# Patient Record
Sex: FEMALE | Race: OTHER
[De-identification: names, ages, dates, MRNs, and addresses within clinical notes are randomized per-mention and may not be internally consistent; named-entity substitution may affect disease eponyms.]

---

## 2019-04-03 ENCOUNTER — HOSPITAL ENCOUNTER (OUTPATIENT)
Dept: HOSPITAL 61 - 3 SO LND | Age: 19
Setting detail: OBSERVATION
Discharge: HOME | End: 2019-04-03
Attending: SPECIALIST | Admitting: SPECIALIST
Payer: SELF-PAY

## 2019-04-03 DIAGNOSIS — N93.9: ICD-10-CM

## 2019-04-03 DIAGNOSIS — Z3A.25: ICD-10-CM

## 2019-04-03 DIAGNOSIS — R10.9: ICD-10-CM

## 2019-04-03 DIAGNOSIS — O26.892: Primary | ICD-10-CM

## 2019-04-03 LAB
APTT PPP: YELLOW S
BACTERIA #/AREA URNS HPF: 0 /HPF
BILIRUB UR QL STRIP: NEGATIVE
FIBRINOGEN PPP-MCNC: CLEAR MG/DL
NITRITE UR QL STRIP: NEGATIVE
PH UR STRIP: 7 [PH]
PROT UR STRIP-MCNC: NEGATIVE MG/DL
RBC #/AREA URNS HPF: 0 /HPF (ref 0–2)
SQUAMOUS #/AREA URNS LPF: (no result) /LPF
UROBILINOGEN UR-MCNC: 0.2 MG/DL
WBC #/AREA URNS HPF: (no result) /HPF (ref 0–4)

## 2019-04-03 PROCEDURE — 87086 URINE CULTURE/COLONY COUNT: CPT

## 2019-04-03 PROCEDURE — G0379 DIRECT REFER HOSPITAL OBSERV: HCPCS

## 2019-04-03 PROCEDURE — 76805 OB US >/= 14 WKS SNGL FETUS: CPT

## 2019-04-03 PROCEDURE — 81001 URINALYSIS AUTO W/SCOPE: CPT

## 2019-04-03 PROCEDURE — G0378 HOSPITAL OBSERVATION PER HR: HCPCS

## 2019-04-03 NOTE — RAD
Obstetrical ultrasound, 4/3/2019:



History: Unsure of dates, no prenatal care



There is a single intrauterine fetus in a variable orientation.  The fetal

biparietal diameter measures 4.9 cm compatible with a gestational age of 20-21

weeks.  This corresponds well with the other fetal measurements yielding an

average gestational age of 20 weeks and 3 days and a sonographic EDC of

8/18/2019.



Normal fetal activity and heart motion were seen.  A four-chamber fetal heart

is evident with a fetal heart rate of 137 bpm.  Fluid is identified in the

fetal bladder and stomach.  The visualized portions of the fetal spine and

kidneys are unremarkable.  A three-vessel umbilical cord is identified with a

normal fetal cord insertion site.



A normal amount of amniotic fluid is present.  The placenta lies posteriorly

extending into the fundal region.  There is no evidence of placenta previa. 

The cervical length is 3.8 cm.



IMPRESSION: Single viable intrauterine fetus of 20-21 weeks gestational age as

described above.

## 2020-12-11 VITALS
DIASTOLIC BLOOD PRESSURE: 74 MMHG | SYSTOLIC BLOOD PRESSURE: 106 MMHG | DIASTOLIC BLOOD PRESSURE: 74 MMHG | SYSTOLIC BLOOD PRESSURE: 106 MMHG | SYSTOLIC BLOOD PRESSURE: 106 MMHG | DIASTOLIC BLOOD PRESSURE: 74 MMHG

## 2022-01-06 ENCOUNTER — HOSPITAL ENCOUNTER (OUTPATIENT)
Dept: HOSPITAL 61 - 3 SO LND | Age: 22
Setting detail: OBSERVATION
LOS: 1 days | Discharge: HOME | End: 2022-01-07
Attending: OBSTETRICS & GYNECOLOGY | Admitting: OBSTETRICS & GYNECOLOGY
Payer: MEDICAID

## 2022-01-06 VITALS — BODY MASS INDEX: 23.32 KG/M2 | WEIGHT: 131.62 LBS | HEIGHT: 63 IN

## 2022-01-06 DIAGNOSIS — R10.30: ICD-10-CM

## 2022-01-06 DIAGNOSIS — Z79.899: ICD-10-CM

## 2022-01-06 DIAGNOSIS — O26.899: Primary | ICD-10-CM

## 2022-01-06 DIAGNOSIS — Z3A.00: ICD-10-CM

## 2022-01-06 LAB
AMPHETAMINE/METHAMPHETAMINE: (no result)
APTT PPP: YELLOW S
BACTERIA #/AREA URNS HPF: (no result) /HPF
BARBITURATES UR-MCNC: (no result) UG/ML
BASOPHILS # BLD AUTO: 0 X10^3/UL (ref 0–0.2)
BASOPHILS NFR BLD: 0 % (ref 0–3)
BENZODIAZ UR-MCNC: (no result) UG/L
BILIRUB UR QL STRIP: NEGATIVE
CANNABINOIDS UR-MCNC: (no result) UG/L
COCAINE UR-MCNC: (no result) NG/ML
EOSINOPHIL NFR BLD: 0.3 X10^3/UL (ref 0–0.7)
EOSINOPHIL NFR BLD: 3 % (ref 0–3)
ERYTHROCYTE [DISTWIDTH] IN BLOOD BY AUTOMATED COUNT: 19.2 % (ref 11.5–14.5)
FIBRINOGEN PPP-MCNC: CLEAR MG/DL
HCT VFR BLD CALC: 21.6 % (ref 36–47)
HGB BLD-MCNC: 6.5 G/DL (ref 12–15.5)
LYMPHOCYTES # BLD: 1.1 X10^3/UL (ref 1–4.8)
LYMPHOCYTES NFR BLD AUTO: 11 % (ref 24–48)
MCH RBC QN AUTO: 18 PG (ref 25–35)
MCHC RBC AUTO-ENTMCNC: 30 G/DL (ref 31–37)
MCV RBC AUTO: 61 FL (ref 79–100)
METHADONE SERPL-MCNC: (no result) NG/ML
MONO #: 0.6 X10^3/UL (ref 0–1.1)
MONOCYTES NFR BLD: 6 % (ref 0–9)
NEUT #: 8.1 X10^3/UL (ref 1.8–7.7)
NEUTROPHILS NFR BLD AUTO: 81 % (ref 31–73)
NITRITE UR QL STRIP: NEGATIVE
OPIATES UR-MCNC: (no result) NG/ML
PCP SERPL-MCNC: (no result) MG/DL
PH UR STRIP: 6.5 [PH]
PLATELET # BLD AUTO: 246 X10^3/UL (ref 140–400)
PROT UR STRIP-MCNC: NEGATIVE MG/DL
RBC # BLD AUTO: 3.54 X10^6/UL (ref 3.5–5.4)
RBC #/AREA URNS HPF: 0 /HPF (ref 0–2)
UROBILINOGEN UR-MCNC: 1 MG/DL
WBC # BLD AUTO: 10 X10^3/UL (ref 4–11)
WBC #/AREA URNS HPF: (no result) /HPF (ref 0–4)

## 2022-01-06 PROCEDURE — G0379 DIRECT REFER HOSPITAL OBSERV: HCPCS

## 2022-01-06 PROCEDURE — 84466 ASSAY OF TRANSFERRIN: CPT

## 2022-01-06 PROCEDURE — 86762 RUBELLA ANTIBODY: CPT

## 2022-01-06 PROCEDURE — 82728 ASSAY OF FERRITIN: CPT

## 2022-01-06 PROCEDURE — 76819 FETAL BIOPHYS PROFIL W/O NST: CPT

## 2022-01-06 PROCEDURE — 76815 OB US LIMITED FETUS(S): CPT

## 2022-01-06 PROCEDURE — G0378 HOSPITAL OBSERVATION PER HR: HCPCS

## 2022-01-06 PROCEDURE — 83540 ASSAY OF IRON: CPT

## 2022-01-06 PROCEDURE — 96365 THER/PROPH/DIAG IV INF INIT: CPT

## 2022-01-06 PROCEDURE — 87653 STREP B DNA AMP PROBE: CPT

## 2022-01-06 PROCEDURE — 86901 BLOOD TYPING SEROLOGIC RH(D): CPT

## 2022-01-06 PROCEDURE — 86900 BLOOD TYPING SEROLOGIC ABO: CPT

## 2022-01-06 PROCEDURE — 87340 HEPATITIS B SURFACE AG IA: CPT

## 2022-01-06 PROCEDURE — 81001 URINALYSIS AUTO W/SCOPE: CPT

## 2022-01-06 PROCEDURE — 86850 RBC ANTIBODY SCREEN: CPT

## 2022-01-06 PROCEDURE — 87426 SARSCOV CORONAVIRUS AG IA: CPT

## 2022-01-06 PROCEDURE — 80053 COMPREHEN METABOLIC PANEL: CPT

## 2022-01-06 PROCEDURE — 96366 THER/PROPH/DIAG IV INF ADDON: CPT

## 2022-01-06 PROCEDURE — 80307 DRUG TEST PRSMV CHEM ANLYZR: CPT

## 2022-01-06 PROCEDURE — 85025 COMPLETE CBC W/AUTO DIFF WBC: CPT

## 2022-01-06 PROCEDURE — 86592 SYPHILIS TEST NON-TREP QUAL: CPT

## 2022-01-06 PROCEDURE — 36415 COLL VENOUS BLD VENIPUNCTURE: CPT

## 2022-01-07 LAB
ALBUMIN SERPL-MCNC: 2.3 G/DL (ref 3.4–5)
ALBUMIN/GLOB SERPL: 0.5 {RATIO} (ref 1–1.7)
ALP SERPL-CCNC: 167 U/L (ref 46–116)
ALT SERPL-CCNC: 12 U/L (ref 14–59)
ANION GAP SERPL CALC-SCNC: 5 MMOL/L (ref 6–14)
ANISOCYTOSIS BLD QL SMEAR: SLIGHT
AST SERPL-CCNC: 18 U/L (ref 15–37)
BILIRUB SERPL-MCNC: 0.7 MG/DL (ref 0.2–1)
BUN SERPL-MCNC: 7 MG/DL (ref 7–20)
BUN/CREAT SERPL: 14 (ref 6–20)
CALCIUM SERPL-MCNC: 7.6 MG/DL (ref 8.5–10.1)
CHLORIDE SERPL-SCNC: 101 MMOL/L (ref 98–107)
CO2 SERPL-SCNC: 26 MMOL/L (ref 21–32)
CREAT SERPL-MCNC: 0.5 MG/DL (ref 0.6–1)
GFR SERPLBLD BASED ON 1.73 SQ M-ARVRAT: 152.9 ML/MIN
GLUCOSE SERPL-MCNC: 65 MG/DL (ref 70–99)
HYPOCHROMIA BLD QL SMEAR: (no result)
MICROCYTES BLD QL SMEAR: (no result)
PLATELET # BLD EST: ADEQUATE 10*3/UL
POLYCHROMASIA BLD QL SMEAR: SLIGHT
POTASSIUM SERPL-SCNC: 3.9 MMOL/L (ref 3.5–5.1)
PROT SERPL-MCNC: 6.5 G/DL (ref 6.4–8.2)
SODIUM SERPL-SCNC: 132 MMOL/L (ref 136–145)

## 2022-01-07 NOTE — RAD
US OB LIMITED, US FETAL BIOPHYSICAL PROFILE W/O



Clinical Indication: Reason: No Prenatal Care; possible labor. Covid positive.



Comparison: None. 



Technique: Multiple grayscale images, color Doppler, and M-mode images of the uterus are obtained.



Findings:

There is a single intrauterine gestation in cephalic presentation. The placenta is posterior in locat
ion without evidence of placenta previa. The amount of amniotic fluid appears appropriate.  Amniotic 
fluid index is 10.4 cm. Cervical length is 4.1 cm.  



Biometrical data:



BPD = 9.1 cm for 37 weeks 0 days.

HC   = 32.8 cm for 37 weeks 2 days.

AC   = 34.2 cm for 37 weeks 1 days.

FL    = 7.3 cm for  37 weeks 1 days.

HC/AC ratio = 0.96.



Overall, the estimated sonographic gestational age is 37 weeks and 3 days for an estimated date of de
livery of January 25, 2022. The estimated date of delivery provided by the last menstrual period is u
nknown.  Estimated fetal weight is 3270 +/- 484 grams.



The estimated fetal heart rate is 113 beats per minute. 



A complete fetal anatomic survey is not technically possible due to advanced gestational age. There i
s a three-vessel cord. Fetal stomach and urinary bladder are identified. Both kidneys are seen.  



The maternal ovaries are not identified in the adnexa due to overlying bowel gas.



Fetal breathing movements: 2 out of 2.

Fetal motion: 2 out of 2.

Fetal tone: 2 out of 2. 

Amniotic fluid volume: 2 out of 2.

Therefore, the biophysical profile score is 8 out of 8.



IMPRESSION:

1.  Single live intrauterine gestation with estimated sonographic gestational age of 37 weeks and 3 d
ays.

2.  Biophysical profile score is 8 out of 8.



Electronically signed by: Octavio Chapa MD (1/7/2022 11:20 AM) [id number]

## 2022-01-09 ENCOUNTER — HOSPITAL ENCOUNTER (OUTPATIENT)
Dept: HOSPITAL 61 - 3 SO LND | Age: 22
Setting detail: OBSERVATION
Discharge: HOME | End: 2022-01-09
Attending: OBSTETRICS & GYNECOLOGY | Admitting: OBSTETRICS & GYNECOLOGY
Payer: SELF-PAY

## 2022-01-09 DIAGNOSIS — U07.1: ICD-10-CM

## 2022-01-09 DIAGNOSIS — Z3A.37: ICD-10-CM

## 2022-01-09 PROCEDURE — 82947 ASSAY GLUCOSE BLOOD QUANT: CPT

## 2022-01-09 PROCEDURE — 96366 THER/PROPH/DIAG IV INF ADDON: CPT

## 2022-01-09 PROCEDURE — 36415 COLL VENOUS BLD VENIPUNCTURE: CPT

## 2022-01-09 PROCEDURE — G0378 HOSPITAL OBSERVATION PER HR: HCPCS

## 2022-01-09 PROCEDURE — 59025 FETAL NON-STRESS TEST: CPT

## 2022-01-09 PROCEDURE — G0379 DIRECT REFER HOSPITAL OBSERV: HCPCS

## 2022-01-09 PROCEDURE — 96365 THER/PROPH/DIAG IV INF INIT: CPT

## 2022-01-11 ENCOUNTER — HOSPITAL ENCOUNTER (OUTPATIENT)
Dept: HOSPITAL 61 - 3 SO LND | Age: 22
Setting detail: OBSERVATION
Discharge: HOME | End: 2022-01-11
Attending: OBSTETRICS & GYNECOLOGY | Admitting: OBSTETRICS & GYNECOLOGY
Payer: MEDICAID

## 2022-01-11 VITALS — HEIGHT: 63 IN | BODY MASS INDEX: 23.32 KG/M2 | WEIGHT: 131.62 LBS

## 2022-01-11 DIAGNOSIS — O99.013: Primary | ICD-10-CM

## 2022-01-11 DIAGNOSIS — D64.9: ICD-10-CM

## 2022-01-11 DIAGNOSIS — Z3A.38: ICD-10-CM

## 2022-01-11 DIAGNOSIS — Z79.899: ICD-10-CM

## 2022-01-11 LAB
APTT PPP: YELLOW S
BACTERIA #/AREA URNS HPF: 0 /HPF
BASOPHILS # BLD AUTO: 0.1 X10^3/UL (ref 0–0.2)
BASOPHILS NFR BLD: 1 % (ref 0–3)
BILIRUB UR QL STRIP: NEGATIVE
EOSINOPHIL NFR BLD: 0.3 X10^3/UL (ref 0–0.7)
EOSINOPHIL NFR BLD: 3 % (ref 0–3)
ERYTHROCYTE [DISTWIDTH] IN BLOOD BY AUTOMATED COUNT: 19.4 % (ref 11.5–14.5)
FIBRINOGEN PPP-MCNC: CLEAR MG/DL
HCT VFR BLD CALC: 21.4 % (ref 36–47)
HGB BLD-MCNC: 6.6 G/DL (ref 12–15.5)
LYMPHOCYTES # BLD: 1.9 X10^3/UL (ref 1–4.8)
LYMPHOCYTES NFR BLD AUTO: 18 % (ref 24–48)
MCH RBC QN AUTO: 20 PG (ref 25–35)
MCHC RBC AUTO-ENTMCNC: 31 G/DL (ref 31–37)
MCV RBC AUTO: 64 FL (ref 79–100)
MONO #: 0.6 X10^3/UL (ref 0–1.1)
MONOCYTES NFR BLD: 5 % (ref 0–9)
NEUT #: 7.7 X10^3/UL (ref 1.8–7.7)
NEUTROPHILS NFR BLD AUTO: 73 % (ref 31–73)
NITRITE UR QL STRIP: NEGATIVE
PH UR STRIP: 7 [PH]
PLATELET # BLD AUTO: 232 X10^3/UL (ref 140–400)
PROT UR STRIP-MCNC: NEGATIVE MG/DL
RBC # BLD AUTO: 3.36 X10^6/UL (ref 3.5–5.4)
RBC #/AREA URNS HPF: 0 /HPF (ref 0–2)
UROBILINOGEN UR-MCNC: 0.2 MG/DL
WBC # BLD AUTO: 10.6 X10^3/UL (ref 4–11)
WBC #/AREA URNS HPF: (no result) /HPF (ref 0–4)

## 2022-01-11 PROCEDURE — 81001 URINALYSIS AUTO W/SCOPE: CPT

## 2022-01-11 PROCEDURE — 96366 THER/PROPH/DIAG IV INF ADDON: CPT

## 2022-01-11 PROCEDURE — 59025 FETAL NON-STRESS TEST: CPT

## 2022-01-11 PROCEDURE — G0379 DIRECT REFER HOSPITAL OBSERV: HCPCS

## 2022-01-11 PROCEDURE — 87086 URINE CULTURE/COLONY COUNT: CPT

## 2022-01-11 PROCEDURE — 36415 COLL VENOUS BLD VENIPUNCTURE: CPT

## 2022-01-11 PROCEDURE — 96365 THER/PROPH/DIAG IV INF INIT: CPT

## 2022-01-11 PROCEDURE — 85025 COMPLETE CBC W/AUTO DIFF WBC: CPT

## 2022-01-11 PROCEDURE — G0378 HOSPITAL OBSERVATION PER HR: HCPCS

## 2022-01-11 NOTE — PDOC1
OB/GYN H&P


Date of Admission:


Date of Admission:  2022 at 14:30





History of Present Illness:


22yo  @ 38.0 weeks by 37 week US. No PNC. Initial eval last week for ROL, 

prenatal panel completed with significant Anemia. Presents today for venofer 

infusion # 3. Denies complaints. Denies complications with previous 

labor/delivery. Her friend is present for translation.





Past Medical History:


PMH:


Denies


Grav:  4


Para:  3





Social History:


Smoke:  No


ALCOHOL:  none





Medications:


Meds:





Current Medications








 Medications


  (Trade)  Dose


 Ordered  Sig/Marianna


 Route


 PRN Reason  Start Time


 Stop Time Status Last Admin


Dose Admin


 


 Iron Sucrose 200


 mg/Sodium Chloride  110 ml @ 


 55 mls/hr  1X  ONCE


 IV


   22 16:00


 22 17:59 DC 22 16:46














Allergies:


Coded Allergies:  


     No Known Drug Allergies (Unverified , 4/3/19)





Physical Exam:


PE:


GENERAL:       No apparent distress. Alert and oriented.


HEENT:            Head normocephalic, atraumatic.


NECK:              Supple


LUNGS:            Clear to auscultation.


HEART:            RRR, S1, S2 present, pulses intact


ABDOMEN:       Soft, positive bowel sounds.


EXTREMITIES:  No cyanosis or edema.


NEUROLOGIC:  Normal speech, normal tone


PSYCHIATRIC:  Normal affect, normal mood.


SKIN:                No ulceration.


Labs:





                                Laboratory Tests








Test


 22


15:50


 


White Blood Count


 10.6 x10^3/uL


(4.0-11.0)


 


Red Blood Count


 3.36 x10^6/uL


(3.50-5.40)  L


 


Hemoglobin


 6.6 g/dL


(12.0-15.5)  *L


 


Hematocrit


 21.4 %


(36.0-47.0)  L


 


Mean Corpuscular Volume


 64 fL ()


L


 


Mean Corpuscular Hemoglobin


 20 pg (25-35)


L


 


Mean Corpuscular Hemoglobin


Concent 31 g/dL


(31-37)


 


Red Cell Distribution Width


 19.4 %


(11.5-14.5)  H


 


Platelet Count


 232 x10^3/uL


(140-400)


 


Neutrophils (%) (Auto) 73 % (31-73)  


 


Lymphocytes (%) (Auto) 18 % (24-48)  L


 


Monocytes (%) (Auto) 5 % (0-9)  


 


Eosinophils (%) (Auto) 3 % (0-3)  


 


Basophils (%) (Auto) 1 % (0-3)  


 


Neutrophils # (Auto)


 7.7 x10^3/uL


(1.8-7.7)


 


Lymphocytes # (Auto)


 1.9 x10^3/uL


(1.0-4.8)


 


Monocytes # (Auto)


 0.6 x10^3/uL


(0.0-1.1)


 


Eosinophils # (Auto)


 0.3 x10^3/uL


(0.0-0.7)


 


Basophils # (Auto)


 0.1 x10^3/uL


(0.0-0.2)


 


Urine Collection Type Unknown  


 


Urine Color Yellow  


 


Urine Clarity Clear  


 


Urine pH


 7.0 (<5.0-8.0)





 


Urine Specific Gravity


 <=1.005


(1.000-1.030)


 


Urine Protein


 Negative mg/dL


(NEG-TRACE)


 


Urine Glucose (UA)


 Negative mg/dL


(NEG)


 


Urine Ketones (Stick)


 Negative mg/dL


(NEG)


 


Urine Blood


 Negative (NEG)





 


Urine Nitrite


 Negative (NEG)





 


Urine Bilirubin


 Negative (NEG)





 


Urine Urobilinogen Dipstick


 0.2 mg/dL (0.2


mg/dL)


 


Urine Leukocyte Esterase Trace (NEG)  


 


Urine RBC 0 /HPF (0-2)  


 


Urine WBC


 1-4 /HPF (0-4)





 


Urine Squamous Epithelial


Cells Many /LPF  





 


Urine Bacteria


 0 /HPF (0-FEW)








                                Laboratory Tests


22 15:50








                                Laboratory Tests


22 15:50











Assessment & Plan:


A/P


1. 22yo  @ 38.0 weeks


2. JENNIFER (6.6/21.4) - Continue Venofer 200mg IVPB 3x/weekly for three more doses. 


3. No PNC


4. Impaired glucose tolerance - No three hour result (75 fasting)/192/151


5. Reactive NST





Instructed patient to f/u in office this week. Discussed M referral for 

management of significant anemia at term, pt. amenable to referral to [facility]. 

Reviewed ANDERSON, PIH and labor precautions.











FLACA ORTA CNM                2022 22:00
